# Patient Record
Sex: MALE | Race: WHITE | URBAN - METROPOLITAN AREA
[De-identification: names, ages, dates, MRNs, and addresses within clinical notes are randomized per-mention and may not be internally consistent; named-entity substitution may affect disease eponyms.]

---

## 2017-05-30 ENCOUNTER — TELEPHONE (OUTPATIENT)
Dept: BEHAVIORAL HEALTH | Facility: CLINIC | Age: 37
End: 2017-05-30

## 2017-05-30 NOTE — TELEPHONE ENCOUNTER
S:  5/30/17 Client call for a referral to IP Chemical Dependency [place].   B:  The client stated he has a hx of Alcohol use, and he have under   gone IP CD [place] at [place] last summer. The client stated he is on   probation for felony domestic, charge with making terrorist threats.   A:  [facility]  Hx of Depression/Anxiety - prescribed Prozac  Hx of High Blood Pressure - prescribed Metoprolol.   R:  The client stated he had a Rule 25 Assessment done at [facility] in [facility] and he will fax it to Intake. DARRENT

## 2017-05-30 NOTE — TELEPHONE ENCOUNTER
5/30/17 Received a 1 page CD recommendation assessment from client. Left a VM for client that a comprehensive CD Assessment is needed for the referral to LP, and that he can contact Intake to schedule an Assessment. KEITH

## 2017-06-01 NOTE — TELEPHONE ENCOUNTER
6/1/2017 I did review his rule 25, but wanted to talk to Johnny to make sure that he wanted LP and just confirm last use dates. I left messages on his tel number [phone] 2x's and he hasn't gotten back to me. I also called his [phone] number and it was also busy. I'm off on Fri, so asked Suzanna to follow up tomorrow. Basilio COFFEY, NADINE

## 2017-06-02 NOTE — TELEPHONE ENCOUNTER
6/2/2017  I left a vm for Ed at [phone], the given contact number by the CD Evaluation counselor, Vale Tellez.  It appears pt is currently in senior living. I left a vm for Vale the CD evaluator to confirm that pt is in senior living and to obtain a good contact number.

## 2017-06-05 NOTE — TELEPHONE ENCOUNTER
6/5/2017  I received a vm from Vale at [facility] (CD Evaluator) stating pt was in [facility] half-way, but has since been released and is living with a friend close to the [facility].  She stated if she hears from him, she will have him call us.